# Patient Record
Sex: FEMALE | Race: WHITE | URBAN - METROPOLITAN AREA
[De-identification: names, ages, dates, MRNs, and addresses within clinical notes are randomized per-mention and may not be internally consistent; named-entity substitution may affect disease eponyms.]

---

## 2022-10-18 ENCOUNTER — OFFICE VISIT (OUTPATIENT)
Dept: NEUROLOGY | Age: 72
End: 2022-10-18
Payer: MEDICARE

## 2022-10-18 VITALS
RESPIRATION RATE: 16 BRPM | SYSTOLIC BLOOD PRESSURE: 118 MMHG | BODY MASS INDEX: 21.37 KG/M2 | TEMPERATURE: 97.8 F | WEIGHT: 113.2 LBS | DIASTOLIC BLOOD PRESSURE: 60 MMHG | HEIGHT: 61 IN

## 2022-10-18 DIAGNOSIS — Z82.0 FAMILY HISTORY OF ALZHEIMER'S DISEASE: ICD-10-CM

## 2022-10-18 DIAGNOSIS — R41.3 MEMORY DYSFUNCTION: Primary | ICD-10-CM

## 2022-10-18 DIAGNOSIS — Z85.810 HISTORY OF TONGUE CANCER: ICD-10-CM

## 2022-10-18 PROBLEM — I10 PRIMARY HYPERTENSION: Status: ACTIVE | Noted: 2022-10-18

## 2022-10-18 PROBLEM — R41.81 AGE-RELATED COGNITIVE DECLINE: Status: ACTIVE | Noted: 2020-07-21

## 2022-10-18 PROCEDURE — G8427 DOCREV CUR MEDS BY ELIG CLIN: HCPCS | Performed by: PSYCHIATRY & NEUROLOGY

## 2022-10-18 PROCEDURE — G8400 PT W/DXA NO RESULTS DOC: HCPCS | Performed by: PSYCHIATRY & NEUROLOGY

## 2022-10-18 PROCEDURE — 1101F PT FALLS ASSESS-DOCD LE1/YR: CPT | Performed by: PSYCHIATRY & NEUROLOGY

## 2022-10-18 PROCEDURE — 99205 OFFICE O/P NEW HI 60 MIN: CPT | Performed by: PSYCHIATRY & NEUROLOGY

## 2022-10-18 PROCEDURE — G8420 CALC BMI NORM PARAMETERS: HCPCS | Performed by: PSYCHIATRY & NEUROLOGY

## 2022-10-18 PROCEDURE — 3017F COLORECTAL CA SCREEN DOC REV: CPT | Performed by: PSYCHIATRY & NEUROLOGY

## 2022-10-18 PROCEDURE — 1090F PRES/ABSN URINE INCON ASSESS: CPT | Performed by: PSYCHIATRY & NEUROLOGY

## 2022-10-18 PROCEDURE — G8536 NO DOC ELDER MAL SCRN: HCPCS | Performed by: PSYCHIATRY & NEUROLOGY

## 2022-10-18 PROCEDURE — G8432 DEP SCR NOT DOC, RNG: HCPCS | Performed by: PSYCHIATRY & NEUROLOGY

## 2022-10-18 PROCEDURE — 1123F ACP DISCUSS/DSCN MKR DOCD: CPT | Performed by: PSYCHIATRY & NEUROLOGY

## 2022-10-18 RX ORDER — OMEGA-3 FATTY ACIDS/FISH OIL 300-1000MG
2000 CAPSULE ORAL DAILY
COMMUNITY

## 2022-10-18 RX ORDER — LISINOPRIL 5 MG/1
5 TABLET ORAL DAILY
COMMUNITY
Start: 2022-08-13

## 2022-10-18 NOTE — PROGRESS NOTES
Chief Complaint   Patient presents with    Follow-up     Was see Dr Randy Cazares in [facility] and states that she wants to switch. Knows that she has memory loss.

## 2022-10-18 NOTE — ASSESSMENT & PLAN NOTE
Patient clearly with some cognitive impairment but unclear related to pathology  She has a family history on her maternal and paternal line of dementia  Patient has undergone chemo and radiation therapy  History of tongue cancer with mets to the lymph nodes    We will check MRI of the brain for evaluation of possible metastasis versus other pathology  Check EEG  Order neuropsych testing with Dr. Vaishali Riddle    For now continue activity as tolerated daughter continues to be involved with the patient's care and I have encouraged her to continue with that

## 2022-10-18 NOTE — PROGRESS NOTES
PierreJillian 85  In Office NEW Pt VISIT         Americo Nelson is a 81 y.o. female who presents today for the following:  Chief Complaint   Patient presents with    Follow-up     Was see Dr Zia Jacobs in [facility] and states that she wants to switch. Knows that she has memory loss. ASSESSMENT AND PLAN    1. Memory dysfunction  Assessment & Plan:   Patient clearly with some cognitive impairment but unclear related to pathology  She has a family history on her maternal and paternal line of dementia  Patient has undergone chemo and radiation therapy  History of tongue cancer with mets to the lymph nodes    We will check MRI of the brain for evaluation of possible metastasis versus other pathology  Check EEG  Order neuropsych testing with Dr. Eduardo Hinkle    For now continue activity as tolerated daughter continues to be involved with the patient's care and I have encouraged her to continue with that  Orders:  -     REFERRAL TO NEUROPSYCHOLOGY  -     MRI BRAIN W WO CONT; Future  -     EEG; Future  2. Family history of Alzheimer's disease  Comments: On both maternal and paternal line  Assessment & Plan:     3. History of tongue cancer  Comments: With metastasis to lymph nodes history of chemo and radiation therapy  Assessment & Plan:           Follow-up and Dispositions    Return in about 6 months (around 4/18/2023) for In office appointment. ICD-10-CM ICD-9-CM    1. Memory dysfunction  R41.3 780.93 REFERRAL TO NEUROPSYCHOLOGY      MRI BRAIN W WO CONT      EEG      2. Family history of Alzheimer's disease  Z82.0 V17.2     On both maternal and paternal line      3.  History of tongue cancer  Z85.810 V10.01     With metastasis to lymph nodes history of chemo and radiation therapy        I attest that 60 minutes was spent on today's visit reviewing medical records and diagnostic testing deemed pertinent to this patient's care, along with direct time spent at patient's visit including the history, physical assessment and plan, discussing diagnosis and management along with documentation. HPI    Patient was referred to the practice for the following reason[s]:  Memory issues    Patient is accompanied by: Her daughter Tru Zavaleta  History is obtained predominantly by: Patient patient and review of records  Patient seen previously by Dr. Antolin Noriega neurology at [facility]    Onset: around 2015 after radiation but has progressed sapna notice in past 12 months    Lives independently   IADLs   FALs    Still drives; no accidents    Does own bills but at times over paid bills    Cleans own house    Daughter does more of the big meals     Per pt \"daughter over cooks\"     Walks 5 miles/ day   Not getting lost    Per daughter:  Looks dazed at times  Repeats self   calls frequently sometimes every hour    Family hx: mother and and paternal grandmother    Excerpted from Dr. Alberta Pinedo note on 10/12/2021 from [facility] 19/30 ,100 point MSE; she scored below over peers, 56/80, for her age group, patients in their 62s \"normal\" is 66-77. On short-term memory subsets of testing she scored poorly, 20/48. Lab work; normal TFTs, U74, folic acid, methylmalonic acid. 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  S/p Radiation and chemo  Second resection 2016      Osteoporosis    Vertigo October 2021   Seen in the ED at [facility] felt to be related to benign positional vertigo and she was also hypertensive with systolic blood pressures in the 190s and found to have a UTI      Pertinent diagnostic data  Imaging Results:  CT Head wo IV Contrast  Narrative: CLINICAL HISTORY: Dizziness. TECHNIQUE: CT head without IV contrast. Axial images were obtained with coronal and sagittal constructions. Contrast: None. COMPARISON: None. FINDINGS:    No acute intracranial hemorrhage. No mass effect or midline shift. Ventricles and basal cisterns are symmetric and intact. Gray-white matter differentiation is preserved. Benign-appearing intracranial complications. Globes are intact bilaterally. No acute osseous findings in the skull. Mastoid air cells and visualized paranasal sinuses are clear. Impression: 1. No acute intracranial findings. DictatedMelva Lobe On: 10/1/2021 1:03 PM  Electronically signed by: Sayra Tsai On: 10/1/2021 1:09 PM    No results found for this or any previous visit. No Known Allergies    Current Outpatient Medications   Medication Sig    aspirin 81 mg cap aspirin    multivitamin-minerals-ferrous gluconate 9 mg iron/15 mL oral liquid Take  by mouth daily. lisinopriL (PRINIVIL, ZESTRIL) 5 mg tablet Take 5 mg by mouth daily. fish oil-omega-3-DHA--1,000 mg capsule Take 2,000 mg by mouth daily. No current facility-administered medications for this visit. Past medical history/surgical history, family history, and social history have been reviewed for today's visit      ROS    A ten system review of constitutional, cardiovascular, respiratory, musculoskeletal, endocrine, skin, SHEENT, genitourinary, psychiatric and neurologic systems was obtained and is unremarkable except as mentioned under HPI          EXAMINATION:     Visit Vitals  /60 (BP 1 Location: Left upper arm, BP Patient Position: Sitting, BP Cuff Size: Adult)   Temp 97.8 °F (36.6 °C) (Temporal)   Resp 16   Ht 5' 1\" (1.549 m)   Wt 113 lb 3.2 oz (51.3 kg)   BMI 21.39 kg/m²         General appearance: Patient is well-developed and well-nourished in no apparent distress and well groomed.     Psych/mental health:  Affect: Appropriate    PHQ  3 most recent PHQ Screens 10/18/2022   Little interest or pleasure in doing things Not at all   Feeling down, depressed, irritable, or hopeless Not at all   Total Score PHQ 2 0       HEENT:   Normocephalic  With evidence of trauma: No  Full range of motion head neck: Yes  Tenderness to palpation of the head neck region: No      Cardiovascular:     Extremities warm to touch:Yes  Extremity swelling: No  Discoloration: No  Evidence of PVD: No    Respiratory:   Dyspnea on exertion: No   Abnormal effort on casual observation: No   Use of portable oxygen: No   Evidence of cyanosis: No     Musculoskeletal:   Evidence of significant bone deformities: No   Spinal curvature: No     Integumentary:    Obvious bruising: No   Lacerations or discoloration on casual observation: No       Neurological Examination:   Mental Status:        MMSE  No flowsheet data found. Formal testing was not completed    Slightly repetitive  Has some difficulty with sequencing  Daughter has to fill in some details and sometimes daughter's perspective is different than patient's perspective but not dramatically different  Clock test completed 10/18/2022: Patient was asked to put the hands at 1110 she actually place the hands at 1:55 she scored a 5 out of 6 on the clock test which roughly equates to 25 on the MMSE          Cranial Nerves:    EOMs intact gaze is conjugate  No nystagmus is appreciated  Facial motor intact bilaterally  Hearing intact to conversation  Voice with normal projection, no evidence of secretion pooling  Shoulder shrug intact bilaterally  No tongue deviation appreciated     Motor:   Normal bulk  No tremor appreciated on today's exam  No abnormal movements appreciated on today's exam  Moves extremities spontaneously and with purpose  5/5 x 4      Sensation: Intact to light touch    Coordination/Cerebellar:   FTN: Intact bilaterally;  Romberg negative    Gait: Ambulates independently with normal gait and station    Reflexes: +2 and symmetrical    Fall risk assessment  Fall Risk Assessment, last 12 mths 10/18/2022   Able to walk? Yes   Fall in past 12 months? 1   Do you feel unsteady? 0   Are you worried about falling 0   Is TUG test greater than 12 seconds? 1   Is the gait abnormal? 0   Number of falls in past 12 months 1   Fall with injury?  0               Butch Minor MS, ANP-BC, [facility]

## 2022-10-18 NOTE — PATIENT INSTRUCTIONS
As per discussion    We have ordered an MRI scan and that can be completed at [facility]    EEG has also been ordered which we will do here  Central scheduling should be calling you to set up the test that have been ordered. If you do not hear from them you can reach out to them at [phone] to get that completed. Referral has been made to Dr. Jarrell Lui for neuropsychiatric evaluation related to cognitive concerns. His  will call you to set up an appointment. However if you have not heard from the office in about a week you can contact their office at [phone]     For now continue activity as tolerated  Will try to get to the bottom of what is going on with your cognitive concerns and therefore we can further direct therapy based on those results    Will wait until we get the whole picture together to include the MRI the EEG and the neuropsych testing  I will go over the results of your MRI and EEG via [employer] and anything differently we may need to do based on those results      Office Policies      Appointments  Please make sure that you arrive for your next appointment at least 15 minutes prior to your appointment time. If for some reason you are going to be late please notify the office to determine if you need to be rescheduled or we can adjust your appointment time      Phone calls/patient messages:  Please allow up to 24 hours for someone in the office to contact you about your call or message. Be mindful your provider may be out of the office or your message may require further review. We encourage you to use [employer] for your messages as this is a faster, more efficient way to communicate with our office    Medication Refills:  Prescription medications require up to 48 business hours to process. We encourage you to use [employer] for your refills. For controlled medications: Please allow up to 72 business hours to process.  Certain medications may require you to  a written prescription at our office. NO narcotic/controlled medications will be prescribed after 4pm Monday through Friday or on weekends    Form/Paperwork Completion:  We ask that you allow 7-14 business days. You may also download your forms to [employer] to have your doctor print off.

## 2022-10-19 ENCOUNTER — TELEPHONE (OUTPATIENT)
Dept: NEUROLOGY | Age: 72
End: 2022-10-19

## 2022-10-19 NOTE — TELEPHONE ENCOUNTER
Re: MRI Brain w/w/o  [id number]    To be performed at [facility] in [place]. Patient has traditional Medicare, no P. A. required. Called the hospital to get fax number. Sent order, office notes from yesterday, copy of demo sheet, I.D., ins. Card., and noted that pt is an established pt at [facility] w/ MRN there [id number]. Faxed 18 pages.  [id number]   fax [phone]. Scanned to Media. Fax confirmation rec'd.

## 2022-11-07 ENCOUNTER — DOCUMENTATION ONLY (OUTPATIENT)
Dept: NEUROLOGY | Age: 72
End: 2022-11-07

## 2022-11-07 NOTE — PROGRESS NOTES
MRI scan of brain completed at [facility]      Results of patient's MRI scan of the brain with and without IV contrast completed 11/2/2022 the results show no enhancing parenchymal lesions; no acute ischemic changes or hemorrhage mild bilateral mastoid air cell ossification noted    There is also noted some generalized cerebral volume loss no hydrocephalus    Results will be scanned to the media tab  Patient has been notified of results via [address]

## 2022-11-16 ENCOUNTER — TELEPHONE (OUTPATIENT)
Dept: NEUROLOGY | Age: 72
End: 2022-11-16

## 2022-11-16 NOTE — TELEPHONE ENCOUNTER
Patient called to speak with Nurse. Pt would like to know if she still needs to keep her appts with Dr Jarrell Lui for 11/22. Pt also has some questions about getting her own copy of her MRI scan because she is unable to get in contact with [facility] herself.  Please call [phone]

## 2022-11-18 ENCOUNTER — TELEPHONE (OUTPATIENT)
Dept: NEUROLOGY | Age: 72
End: 2022-11-18

## 2022-11-18 NOTE — TELEPHONE ENCOUNTER
Call placed to patient. 2 identifiers received. Advised patient per Ari Valero to keep her appointment with Dr. Marcella Martínez on 11/22/22. Patient indicated understanding.

## 2022-11-22 ENCOUNTER — OFFICE VISIT (OUTPATIENT)
Dept: NEUROLOGY | Age: 72
End: 2022-11-22
Payer: MEDICARE

## 2022-11-22 DIAGNOSIS — F43.23 ADJUSTMENT DISORDER WITH MIXED ANXIETY AND DEPRESSED MOOD: ICD-10-CM

## 2022-11-22 DIAGNOSIS — G31.84 MILD COGNITIVE IMPAIRMENT: Primary | ICD-10-CM

## 2022-11-22 DIAGNOSIS — R41.3 MEMORY DYSFUNCTION: Primary | ICD-10-CM

## 2022-11-22 PROCEDURE — 96138 PSYCL/NRPSYC TECH 1ST: CPT | Performed by: CLINICAL NEUROPSYCHOLOGIST

## 2022-11-22 PROCEDURE — 1123F ACP DISCUSS/DSCN MKR DOCD: CPT | Performed by: CLINICAL NEUROPSYCHOLOGIST

## 2022-11-22 PROCEDURE — 96136 PSYCL/NRPSYC TST PHY/QHP 1ST: CPT | Performed by: CLINICAL NEUROPSYCHOLOGIST

## 2022-11-22 PROCEDURE — 96139 PSYCL/NRPSYC TST TECH EA: CPT | Performed by: CLINICAL NEUROPSYCHOLOGIST

## 2022-11-22 PROCEDURE — 90791 PSYCH DIAGNOSTIC EVALUATION: CPT | Performed by: CLINICAL NEUROPSYCHOLOGIST

## 2022-11-22 NOTE — PROGRESS NOTES
Intake Note      Patient Name: Amarjit Paige  YOB: 1950    Age: 81 y.o. Date of Intake: 11/22/2022   Education: 12 Ethnicity White   Gender: Female Referring Provider: Sunny Wood NP     REASON FOR REFERRAL AND EVALUATION PROCEDURES:  Amarjit Paige  was referred for evaluation by her Neurology Nurse Practitioner to assist in differential diagnosis and individualized treatment planning. she understood the rationale and procedures for evaluation, as well as the limits to confidentiality, and agreed to participate. she consented to have this report made available to her  treating providers through her  electronic medical records. History Sources: Patient, Relative (sister), and Medical Records    HISTORY OF PRESENT ILLNESS:  The patient is a 68-year-old woman with medical history significant for memory dysfunction, family history of Alzheimer's disease, history of tongue cancer, and primary hypertension. She presented for clinical interview accompanied by her sister who assisted with establishing history. Per the patient's report, over the past couple months, she has developed a pattern of \"double checking. \"  She reported she double checks her bank account and also repeats herself in conversations. The patient indicated she is aware that she has already done these things but feels the need to do them again. She also reported the presence of attention deficits such as becoming easily distracted. According to her sister, over the course of the past year, the patient has experienced the insidious onset of short-term episodic memory impairment. The patient's sister reported the patient will tell her something and will then repeat herself within 5 or 10 minutes. She also forgets recent events. For example, the patient will pay her sister for something and she will then repeatedly tell her sister that she needs to pay her without recognizing that she already did.   Despite the patient's cognitive difficulties, she reportedly remains functionally independent; however, she reported relying more heavily on GPS for navigating    Pertaining to the patient's psychiatric history, she denied all previous clinically significant psychiatric symptomatology, diagnosis, or treatment. The patient described her recent mood to be \"fine. \"  She denied all history of auditory or visual hallucinations as well as history of passive or active suicidal ideation, intent, or plan. PERTINENT MEDICAL HISTORY:  Patient Active Problem List   Diagnosis Code    Age-related cognitive decline R41.81    Memory dysfunction R41.3    Family history of Alzheimer's disease Z82.0    History of tongue cancer Z85.810    Primary hypertension I10      Pertaining to the patient's other medical and physical functioning, she describes her sleep to be \"good. \"  She estimated sleeping approximately 9 hours per night and generally feels rested throughout the day. To the best of her knowledge, she does not snore and she denied waking due to coughing or gasping for air. The patient also denied acting out her dreams or falling out of bed. The patient reported exercising every day for approximately 3 hours. She also denied the presence of physical problems potentially suggestive of parkinsonism, autonomic dysfunction, or sensory changes. Family neurological history: Positive for Alzheimer's disease in the patient's paternal grandmother and mother. Current Outpatient Medications:     aspirin 81 mg cap, aspirin, Disp: , Rfl:     multivitamin-minerals-ferrous gluconate 9 mg iron/15 mL oral liquid, Take  by mouth daily. , Disp: , Rfl:     lisinopriL (PRINIVIL, ZESTRIL) 5 mg tablet, Take 5 mg by mouth daily. , Disp: , Rfl:     fish oil-omega-3-DHA--1,000 mg capsule, Take 2,000 mg by mouth daily. , Disp: , Rfl:     Pertinent Neurological History:  Seizure: Never  Stroke: Never  TBI: Never    Neurodiagnostic Findings:  Imaging: Neurology documentation only note (11/7/2022) indicates the patient completed an MRI of the brain on 11/2/2022. Results revealed \"no enhancing parenchymal lesions; no acute ischemic changes or hemorrhage mild bilateral mastoid air cell ossification noted. There is also noted some generalized cerebral volume loss no hydrocephalus. \"  EEG: Ordered, not yet obtained    PSYCHOSOCIAL HISTORY:  Birth/Development: Born and raised in [place]  Language: [place]  Education: Graduated from high school  Academic Problems: Repeated the third grade. Typically earned \"C's. 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 1 child passed away  Housing: Independently in private residence  Legal: Denied. No POA    Substance Use:  Alcohol: Denied  Nicotine: Denied  Recreational/Illicit Substances: Denied  Treatment: Denied    BEHAVIORAL OBSERVATIONS:  Appearance: Casually dressed, Well-groomed, and Appeared stated age  Orientation: Person, Place, Time, and Situation. Able to provide nonspecific information related to recent events seen on the news.   Motor: Ambulated independently, Adequate gait, Adequate posture, No involuntary movements, and Adequate strength  Thought Processes: Clear, Coherent, Intact, Logical, and Organized  Hearing and Vision: Adequate  Speech: shows no evidence of impairment  Comprehension: Adequate  Interpersonal Skills: Adequate  Affect: Appropriate  Ability as Historian: Fair  Insight: Fair  Judgment: Fair    STRENGTHS:  Exercising self-direction/Resourceful, Access to housing/residential stability, and Interpersonal/supportive relationships (family, friends, peers)    WEAKNESSES:  Health problems and Cognitive limitations    IMPRESSION:  The patient is a 74-year-old woman who presents with concerns regarding her cognitive functioning. At the current time, the objective presence of her cognitive difficulties, their severity, and the pattern of weaknesses are unknown. The degree to which they are related to normal aging versus psychiatric factors versus an organic etiology requires further clarification. Comprehensive evaluation will assist with obtaining a quantitative assessment of the patient's cognitive and emotional functioning in order to guide differential diagnosis and treatment planning.     ASSESSMENT:  Memory dysfunction: R41.3    PLAN:  Patient will participate in comprehensive evaluation in order to obtain a quantitative assessment of their cognitive and emotional functioning  Differential diagnosis and treatment planning will be based upon results from clinical interview and objective testing  Patient will be provided with review of results, impressions, and recommendations during feedback session  Patient will be encouraged to follow-up with referring provider for ongoing management        TIME DOCUMENTATION:  Clinical Interview: 0028 - 4739 = 20 minutes    [id number]

## 2022-12-06 PROBLEM — F43.23 ADJUSTMENT DISORDER WITH MIXED ANXIETY AND DEPRESSED MOOD: Status: ACTIVE | Noted: 2022-12-06

## 2022-12-06 PROBLEM — G31.84 MILD COGNITIVE IMPAIRMENT: Status: ACTIVE | Noted: 2020-07-21

## 2022-12-06 NOTE — PROGRESS NOTES
Neuropsychological Evaluation Report      Patient Name: Liban Deal  YOB: 1950    Age: 81 y.o. Date of Intake: 2022   Education: 12 Date of Testin2022   Gender: Female Ethnicity: White     Referring Provider: Ramos Bergeron NP     REASON FOR REFERRAL AND EVALUATION PROCEDURES:  Liban Deal  was referred for neuropsychological evaluation by her Neurology Nurse Practitioner to obtain a quantitative assessment of her current level of neurocognitive functioning, assist in differential diagnosis, and aid in individualized treatment planning. The patient understood the rationale and procedures for evaluation, as well as the limits to confidentiality, and they agreed to participate. The patient consented to have this report made available to her  treating providers through her  electronic medical records. This evaluation was completed with the patient by Amol Garnett PsyD with the exception of testing by technician, which was completed by SUGAR Miles under the supervision of Dr. Clemente Bach. History Sources: Patient, Relative (Sister), Medical Records, Rating Scales, and Assessment Instruments    SUMMARY AND IMPRESSION:  The following section is a summary of the patient's pertinent history, test results, and impressions. A more thorough review of the patient's background and test scores can be found below. The patient is a 62-year-old woman with medical history significant for memory dysfunction, family history of Alzheimer's disease, history of tongue cancer, and primary hypertension. Over the course of the past year, she has experienced the insidious onset of gradually progressive short-term episodic memory impairment. While the patient was reported to rely more heavily on her GPS for navigation while driving, she reportedly remains functionally independent.     The patient's neuropsychological evaluation was most significant for mild impairment (~1.5 standard deviations below the mean) on measures of learning, profound impairment (3 standard deviations below the mean) on measures of free recall, and variable but significantly reduced benefit from recognition cueing, with evidence of not recognizing previously encoded stimuli. While executive functioning was variable and generally inefficient (~1 standard deviation below the mean), aspects of executive functioning were more significantly impaired (e.g., inhibition/switching, problem solving, and planning) which also contributed to weakness on visuospatial construction measures more heavily subserved by executive functioning. The patient's performances on remaining domains of cognitive functioning (mental processing speed, attention/working memory, expressive language) remained within normal limits, commensurate with expectations of premorbid functioning. Simulated assessment of daily functioning and practical judgment remained within normal limits. Brief assessment of psychopathology revealed clinically significant symptoms of depression and anxiety. Diagnostically, while the magnitude of impairment demonstrated on free recall memory tests would be more consistent with expectations for dementia (2+ standard deviations below the mean), the patient reportedly remains functionally independent and her simulated functional testing was preserved. Taking these factors into consideration, the diagnosis of mild cognitive impairment is most appropriate. Regarding the etiology of cognitive dysfunction, in a patient of this age, the insidious onset of gradually progressive cognitive impairment suggests the presence of a neurodegenerative condition. More specifically, the short-term episodic memory deficits on testing indicate temporal lobe dysfunction, most likely due to Alzheimer's disease.         ASSESSMENT:  Mild cognitive impairment, with memory loss  Adjustment disorder with mixed anxiety and depressed mood    RECOMMENDATIONS:  The patient currently has a feedback session scheduled for 12/9/2022. During this appointment, the results of the evaluation will be reviewed, recommendations will be offered (see below), and the patient will be provided with the opportunity to ask questions. The patient will be encouraged to review the results of this evaluation with her providers and discuss potential implications for treatment. Specifically:  If desired and not already considered, the patient may benefit from a conversation regarding the trial of a disease course altering medication, which may be of use to slow the progression of her cognitive difficulties. While the patient described her recent mood to be \"fine,\" testing suggested the presence of clinically significant symptoms of depression and anxiety. The patient may benefit from a conversation regarding the trial of psychotropic medication to reduce the symptoms. Rule out of potentially reversible contributory factors may assist in refining differential diagnosis. For this reason, if warranted and not already considered, obtaining the following labs may be beneficial: B12, Folate, TSH, & T4 Free. At this time, the patient's neuropsychological evaluation suggests she does maintain capacity to make decisions. If not already addressed, the patient and her family are encouraged to discuss legal issues such as power of  to assist the patient in future financial and healthcare decisions. Information regarding these issues can be found at www.caringinfo. org or www.agingwithdignity.org/5wishes.html  Continued monitoring and treatment of the patient's neuropsychiatric symptoms/cognitive impairment is recommended. Due to their level of cognitive impairment, they will benefit from a combination of psychopharmacology, environmental management, and concrete behaviorally-focused interventions.  Examples of environmental strategies include:  Keep a steady routine environment and using visual cues, such as whiteboards, to help provide reminders of tasks. Take additional time to complete tasks and limit the amount of external distracters in the environment when having to focus on a task;  Break larger tasks into smaller ones and take frequent, regularly scheduled breaks; Write down information as soon as possible and to utilize a day planner/calendar, especially when recording doctor's appointments and medicine regimens;  Development of a memory book may be beneficial. Memory books typically contain a day planner/calendar component; however, they also contain organized sections for recording frequently asked questions and information about routines that the patient will be able to access and use independently. Pair behaviors or items to be remembered with well learned patterns or routines. For example, place medications by your toothbrush so that brushing your teeth will cue you to take your medication; and,  Designate a memory place in which you keep all of your important personal belongings (e.g. wallet, keys) when not in use. The patient will be provided with psychoeducation regarding empirically determined modifiable risk and protective factors for cognitive decline. Specifically:  The patient will be encouraged to engage in approximately 2.5 hours of physician approved physical activity on a weekly basis. The patient will be encouraged to maintain a healthy diet. they will also be informed of the Mediterranean diet, which has been associated with reduced risk for neurodegenerative conditions as well as heart disease. The patient will be encouraged to maintain a cognitively stimulated lifestyle, also incorporating social interaction. The patient's family and caretakers may benefit from reaching out to local support groups in the community. These resources may be able to provide medical assistance or support and reduce the risk of caregiver burnout.     The Alzheimer's Association (Phone: 428.111.1274; Website: Kae Ye) provides general information about cognitive decline in late life, along with local resources. The book The 36 Hour Day: A Family Guide to Caring for Persons with Alzheimer's Disease, Related Dementing Illnesses, and Memory Loss in Later Life, by Alfa Steward and Ollie Francis. I would like to see the patient for follow-up evaluation in approximately 12 months in order to monitor her progress and update treatment planning. HISTORY OF PRESENT ILLNESS:  The patient is a 68-year-old woman with medical history significant for memory dysfunction, family history of Alzheimer's disease, history of tongue cancer, and primary hypertension. She presented for clinical interview accompanied by her sister who assisted with establishing history. Per the patient's report, over the past couple months, she has developed a pattern of \"double checking. \"  She reported she double checks her bank account and also repeats herself in conversations. The patient indicated she is aware that she has already done these things but feels the need to do them again. She also reported the presence of attention deficits such as becoming easily distracted. According to her sister, over the course of the past year, the patient has experienced the insidious onset of short-term episodic memory impairment. The patient's sister reported the patient will tell her something and will then repeat herself within 5 or 10 minutes. She also forgets recent events. For example, the patient will pay her sister for something and she will then repeatedly tell her sister that she needs to pay her without recognizing that she already did.   Despite the patient's cognitive difficulties, she reportedly remains functionally independent; however, she reported relying more heavily on GPS for navigating     Pertaining to the patient's psychiatric history, she denied all previous clinically significant psychiatric symptomatology, diagnosis, or treatment. The patient described her recent mood to be \"fine. \"  She denied all history of auditory or visual hallucinations as well as history of passive or active suicidal ideation, intent, or plan. PERTINENT MEDICAL HISTORY:       Patient Active Problem List   Diagnosis Code    Age-related cognitive decline R41.81    Memory dysfunction R41.3    Family history of Alzheimer's disease Z82.0    History of tongue cancer Z85.810    Primary hypertension I10      Pertaining to the patient's other medical and physical functioning, she describes her sleep to be \"good. \"  She estimated sleeping approximately 9 hours per night and generally feels rested throughout the day. To the best of her knowledge, she does not snore and she denied waking due to coughing or gasping for air. The patient also denied acting out her dreams or falling out of bed. The patient reported exercising every day for approximately 3 hours. She also denied the presence of physical problems potentially suggestive of parkinsonism, autonomic dysfunction, or sensory changes. Family neurological history: Positive for Alzheimer's disease in the patient's paternal grandmother and mother. Current Outpatient Medications:     aspirin 81 mg cap, aspirin, Disp: , Rfl:     multivitamin-minerals-ferrous gluconate 9 mg iron/15 mL oral liquid, Take  by mouth daily. , Disp: , Rfl:     lisinopriL (PRINIVIL, ZESTRIL) 5 mg tablet, Take 5 mg by mouth daily. , Disp: , Rfl:     fish oil-omega-3-DHA--1,000 mg capsule, Take 2,000 mg by mouth daily. , Disp: , Rfl:      Pertinent Neurological History:  Seizure: Never  Stroke: Never  TBI: Never     Neurodiagnostic Findings:  Imaging: Neurology documentation only note (11/7/2022) indicates the patient completed an MRI of the brain on 11/2/2022. Results revealed \"no enhancing parenchymal lesions; no acute ischemic changes or hemorrhage mild bilateral mastoid air cell ossification noted.   There is also noted some generalized cerebral volume loss no hydrocephalus. \"  EEG: Ordered, not yet obtained     PSYCHOSOCIAL HISTORY:  Birth/Development: Born and raised in [place]  Language: [place]  Education: Graduated from high school  Academic Problems: Repeated the third grade. Typically earned \"C's. 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 1 child passed away  Housing: Independently in private residence  Legal: Denied. No POA     Substance Use:  Alcohol: Denied  Nicotine: Denied  Recreational/Illicit Substances: Denied  Treatment: Denied     BEHAVIORAL OBSERVATIONS:  Appearance: Casually dressed, Well-groomed, and Appeared stated age  Orientation: Person, Place, Time, and Situation. Able to provide nonspecific information related to recent events seen on the news. Motor: Ambulated independently, Adequate gait, Adequate posture, No involuntary movements, and Adequate strength  Thought Processes: Clear, Coherent, Intact, Logical, and Organized  Hearing and Vision: Adequate  Speech: shows no evidence of impairment  Comprehension: Adequate  Interpersonal Skills: Adequate  Affect: Appropriate  Ability as Historian: Fair  Insight: Fair  Judgment: Fair  Testing Behaviors: Rapport was adequately established between the patient and the examiner. The patient remained alert and focused throughout testing. They maintained a cooperative attitude and appeared to approach measures and a goal oriented fashion. TESTING  Measures administered by provider:  Test of Premorbid Functioning (TOPF) and Clock drawing test    Measures administered by technician:  Lanie Hill; Wechsler Adult Intelligence Scale - Fourth Edition (WAIS-IV: Select Components);  Trail Making Test A&B; Mellissa-Rizzo Executive Function System (D-KEFS: Select Components); Modified [employer] (M-WCST); Judgment of Line Orientation (Amee Cease); Chaka Complex Figure Test - Copy (RCFT: Copy); Verbal Fluency (FAS & Animals); Neuropsychological Assessment Battery (NAB: Select Language Components); Osorio Verbal Learning Test - Revised (HVLT-R); Brief Visuospatial Memory Test - Revised (BVMT-R); Wechsler Memory Scale - Fourth Edition (WMS-IV: Select Components); Pillbox Test; Test of Practical Judgment (TOP-J: Form B); [place] Sleepiness Scale (ESS); [place] Sleep Quality Inventory (PSQI); Geriatric Depression Scale - Short Form (GDS-SF); and Generalized Anxiety Disorder - 7 (LINDA-7). Results: For standardized tests, performance was compared to a demographically matched sample of neurocognitively healthy adults using the following classification system to indicate deviation from mean (or average) test performance:    Normally Distributed Not-Normally Distributed   Descriptor Percentile Descriptor Percentile   \"Exceptionally High\" >97th \"Within Normal Limits\" >24th   \"Above Average\" 91st - 97th \"Low Average\" 9th - 24th   \"High Average\" 75th - 90th \"Below Average\" 2nd - 8th   \"Average\" 25th - 74th \"Exceptionally Low\" <2nd   \"Low Average\" 9th - 24th    \"Below Average\" 2nd - 8th    \"Exceptionally Low\" <2nd      Performance and symptom validity are analyzed in a number of ways, including administration of neuropsychological measures that have been empirically shown to identify suboptimal performance or purposeful exaggeration.  These tests and their scores have been redacted from this report in order to ensure test security, but are available to formally trained neuropsychologists upon request. In addition, when possible, the overall pattern of performance is analyzed for consistency between measures, consistency with the expected severity of impairment, and the presenting symptoms are compared against base rates of symptoms in other patients with similar problems. The patient's performances were within acceptable limits, indicating the results of the present evaluation are believed to be valid and reliable. Premorbid Functioning:   Average  Attention:   Brief auditory attention: Low average  Auditory working memory: Average  Processing Speed:  Low average to high average but generally in the average range  Executive Functioning:   Mental set switching: Average; no errors  Problem Solving: Exceptionally low  Inhibition: Average for speed and high average for accuracy  Inhibition/Switching: Discontinued due to time limits. During the completed portion, the patient's accuracy was in the below average range  Visuospatial:  Basic visuoperception: Average  Clock drawing: Within normal limits  Pattern reconstruction: Average  Complex figure copy: Below average due to a disorganized and piecemeal approach. Language:  Confrontation naming: Within normal limits  Letter fluency: Average  Semantic Fluency: Average  Learning   List learning: Below average  Story learning: Low average  Basic figure learning: Exceptionally low  Progressively complex figure learning: Average  Memory:  List recall: Exceptionally low  Story recall: Exceptionally low  Basic figure recall: Exceptionally low  Progressively complex figure recall: Exceptionally low  Recognition:  List recognition: Exceptionally low; significant for false negative responding to previously encoded stimuli  Story recognition: Low average  Basic figure recognition: Below average  Progressively complex figure recognition: Within normal limits  Functional:   Bill pay: Low average  Practical judgment: Low average  Psychiatric/Sleep:   Depression: Mild  Anxiety: Moderate  Daytime sleepiness: Within normal limits  Sleep quality: Elevated. The patient reported difficulty sustaining sleep which she attributed to needing to use the bathroom.     TIME:  Clinical Interview: 7277 - 5187 = 20 minutes  Testing and scoring by provider: 16 minutes  Testing by technician: 3462 - 0019 = 142 minutes  Scoring by technician: 31 minutes    BILLING:  90791 x 1 Unit  96136 x 1 Unit  96138 x 1 Unit  96139 x 5 Units

## 2022-12-09 ENCOUNTER — VIRTUAL VISIT (OUTPATIENT)
Dept: NEUROLOGY | Age: 72
End: 2022-12-09
Payer: MEDICARE

## 2022-12-09 DIAGNOSIS — G31.84 MILD COGNITIVE IMPAIRMENT: Primary | ICD-10-CM

## 2022-12-09 NOTE — PROGRESS NOTES
Pursuant to the emergency declaration under the [facility] waiver authority and the [employer] Act, this Virtual Visit was conducted, with appropriate consent obtained, to reduce the patient's risk of exposure to COVID-19 and provide continuity of care   Services were provided in this manner to substitute for in-person clinic visit. The originating site is the patient's home and the distance site is [facility] at [facility]. These types of teleneuropsychology/telehealth/telemedicine visits were authorized by the President of the [employer], though I/we cannot guarantee what a third party payor will do reimbursement/coverage wise. I indicated that I would evaluate the patient and recommend diagnostics and treatment based on my assessment and impressions, and that our sessions are not being recorded and that personal health information is protected to the best of our abilities. Prior to seeing the patient I reviewed pertinent records, including the previously completed report, the records in [place], and any updated visits from other providers since the patient's last visit. Today, I engaged in a psychoeducational and supportive feedback session with the patient. I provided psychotherapy in the form of psychoeducation and support with respect to the results of the recent Neuropsychological Evaluation, including discussing individual tests as well as patient's areas of neurocognitive strength versus weakness.     We discussed, in detail, the following:  Testing revealed mild to moderate impairment in learning and memory  As the patient remains functionally independent, she meets criteria for the diagnosis of mild cognitive impairment  The patient's cognitive profile is most consistent with an Alzheimer's disease process which is also prevalent in her family history  Reviewed recommendations outlined in report  Answered questions to the best of my ability    Education was provided regarding my diagnostic impressions, and we discussed treatment plan/options. I also answered numerous questions related to the clinical findings, including discussing various methods to improve cognition and mood. Supportive/Cognitive Behavioral/Solution Focused psychotherapy provided. The patient needs to: Follow-up with referring provider for ongoing management  Designate someone as her power of   Use practical compensatory strategies for cognitive weaknesses  Emphasize modifiable protective behaviors including exercise, diet, and cognitive stimulation  Return in 12 months    The patient had the following concerns which I deferred to their referring provider:   meds for cognition      TIME:  Clinical Interview: 1205 - 1225 = 20 minutes  Testing and scoring by provider: 16 minutes  Testing by technician: 1587 - 1527 = 142 minutes  Scoring by technician: 31 minutes  Neuropsychological testing evaluation services*: 139 minutes + 25 minutes feedback = 164 minutes total    BILLING:  39131 x 1 Unit  96136 x 1 Unit  96138 x 1 Unit  96139 x 5 Units  96132 x 1 Unit  96133 x 2 Units    *Neuropsychological testing evaluation services include: Integration of patient data, interpretation of standardized test results and clinical data, clinical decision-making, treatment planning and report, and interactive feedback to the patient, family member(s) or caregiver(s), when performed.

## 2022-12-19 ENCOUNTER — TELEPHONE (OUTPATIENT)
Dept: NEUROLOGY | Age: 72
End: 2022-12-19

## 2022-12-19 NOTE — TELEPHONE ENCOUNTER
Pt states we were supposed to send a script in for her over a month ago but she has not received anything. Does not remember the name of script either. Please call to discuss.  [phone]

## 2022-12-30 ENCOUNTER — TELEPHONE (OUTPATIENT)
Dept: NEUROLOGY | Age: 72
End: 2022-12-30

## 2022-12-30 NOTE — TELEPHONE ENCOUNTER
Confirmed patient id and patient states that she was suppose to be getting a medication but does not know the name of the medication   Advised that I see that she called here with that question on 12/19/2022 and I tried to contact and lvm. Advised that I was trying to find out what the medication was being prescribed. Advised that Estelle Youngblood has not seen her  since 10/18/2022 and I donot see any medication orders from that visit.       Patient did see Dr Donald Valdez on 11/22/2022 not sure if there was to be medication ordered after her visit with Amy Vernon

## 2023-01-02 DIAGNOSIS — Z82.0 FAMILY HISTORY OF ALZHEIMER'S DISEASE: ICD-10-CM

## 2023-01-02 DIAGNOSIS — G31.84 MILD COGNITIVE IMPAIRMENT: Primary | ICD-10-CM

## 2023-01-02 RX ORDER — DONEPEZIL HYDROCHLORIDE 5 MG/1
5 TABLET, FILM COATED ORAL
Qty: 90 TABLET | Refills: 1 | Status: SHIPPED | OUTPATIENT
Start: 2023-01-02

## 2023-01-02 NOTE — PROGRESS NOTES
Order donepezil 5 mg 1 daily based on results of neuropsych testing  It was sent to [facility] pharmacy in [facility] on [address]

## 2023-04-19 ENCOUNTER — OFFICE VISIT (OUTPATIENT)
Dept: NEUROLOGY | Age: 73
End: 2023-04-19
Payer: MEDICARE

## 2023-04-19 VITALS
HEART RATE: 70 BPM | SYSTOLIC BLOOD PRESSURE: 130 MMHG | TEMPERATURE: 97.6 F | RESPIRATION RATE: 16 BRPM | HEIGHT: 61 IN | WEIGHT: 108.2 LBS | OXYGEN SATURATION: 100 % | DIASTOLIC BLOOD PRESSURE: 88 MMHG | BODY MASS INDEX: 20.43 KG/M2

## 2023-04-19 DIAGNOSIS — G31.84 MILD COGNITIVE IMPAIRMENT: Primary | ICD-10-CM

## 2023-04-19 PROCEDURE — 1090F PRES/ABSN URINE INCON ASSESS: CPT | Performed by: PSYCHIATRY & NEUROLOGY

## 2023-04-19 PROCEDURE — G8420 CALC BMI NORM PARAMETERS: HCPCS | Performed by: PSYCHIATRY & NEUROLOGY

## 2023-04-19 PROCEDURE — 3075F SYST BP GE 130 - 139MM HG: CPT | Performed by: PSYCHIATRY & NEUROLOGY

## 2023-04-19 PROCEDURE — 1123F ACP DISCUSS/DSCN MKR DOCD: CPT | Performed by: PSYCHIATRY & NEUROLOGY

## 2023-04-19 PROCEDURE — G9717 DOC PT DX DEP/BP F/U NT REQ: HCPCS | Performed by: PSYCHIATRY & NEUROLOGY

## 2023-04-19 PROCEDURE — G8536 NO DOC ELDER MAL SCRN: HCPCS | Performed by: PSYCHIATRY & NEUROLOGY

## 2023-04-19 PROCEDURE — 99215 OFFICE O/P EST HI 40 MIN: CPT | Performed by: PSYCHIATRY & NEUROLOGY

## 2023-04-19 PROCEDURE — 3017F COLORECTAL CA SCREEN DOC REV: CPT | Performed by: PSYCHIATRY & NEUROLOGY

## 2023-04-19 PROCEDURE — 3079F DIAST BP 80-89 MM HG: CPT | Performed by: PSYCHIATRY & NEUROLOGY

## 2023-04-19 PROCEDURE — 1101F PT FALLS ASSESS-DOCD LE1/YR: CPT | Performed by: PSYCHIATRY & NEUROLOGY

## 2023-04-19 PROCEDURE — G8400 PT W/DXA NO RESULTS DOC: HCPCS | Performed by: PSYCHIATRY & NEUROLOGY

## 2023-04-19 PROCEDURE — G8427 DOCREV CUR MEDS BY ELIG CLIN: HCPCS | Performed by: PSYCHIATRY & NEUROLOGY

## 2023-04-19 RX ORDER — DONEPEZIL HYDROCHLORIDE 10 MG/1
10 TABLET, FILM COATED ORAL
Qty: 90 TABLET | Refills: 3 | Status: SHIPPED | OUTPATIENT
Start: 2023-04-19

## 2023-04-19 NOTE — PROGRESS NOTES
[facility]  In Office Follow-up Pt VISIT         Tammie Salazar is a 81 y.o. female who presents today for the following:  Chief Complaint   Patient presents with    Memory Loss     Patient is here for a follow up to assess memory loss/cognitive impairment and a med evaluation. Follow-up    Medication Evaluation         ASSESSMENT AND PLAN    1. Mild cognitive impairment  Assessment & Plan: We will increase Aricept to 10 mg daily discussed realistic expectations regarding medications and its FDA approved uses versus using this and mild cognitive impairment but given family history and findings on neuropsych testing patient is at high risk for progression to diagnosis of AD    I have strongly encouraged the patient while she still has good capabilities for decision-making to make sure all of her legal medical financial and long-term care plans are completed  Orders:  -     donepeziL (Aricept) 10 mg tablet; Take 1 Tablet by mouth nightly., Normal, Disp-90 Tablet, R-3          Follow-up and Dispositions    Return in about 7 months (around 11/19/2023) for In office appointment. ICD-10-CM ICD-9-CM    1. Mild cognitive impairment  G31.84 331.83 donepeziL (Aricept) 10 mg tablet          I attest that 40 minutes was spent on today's visit reviewing medical records and diagnostic testing deemed pertinent to this patient's care, along with direct time spent at patient's visit including the history, physical assessment and plan, discussing diagnosis and management along with documentation.         HPI    Patient was referred to the practice for the following reason[s]:  Memory issues    Patient is accompanied by: Her daughter Jermain Castellanos  History is obtained predominantly by: Patient patient and review of records  Patient seen previously by Dr. Silas Lara neurology at [facility]    Onset: around 2015 after radiation but has progressed sapna notice in past 15 months    Lives independently   IADLs   FALs    Still drives; no accidents    Does own bills but at times over paid bills    Cleans own house    Daughter does more of the big meals     Per pt \"daughter over cooks\"     Walks 5 miles/ day   Not getting lost    Per daughter:  Looks dazed at times  Repeats self   calls frequently sometimes every hour    Family hx: mother and and paternal grandmother    Excerpted from Dr. Jesus Lee note on 10/12/2021 from [facility] 19/30 ,100 point MSE; she scored below over peers, 56/80, for her age group, patients in their 62s \"normal\" is 66-77. On short-term memory subsets of testing she scored poorly, 20/48. Lab work; normal TFTs, S69, folic acid, methylmalonic acid. 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  S/p Radiation and chemo  Second resection 2016      Osteoporosis    Vertigo October 2021   Seen in the ED at [facility] felt to be related to benign positional vertigo and she was also hypertensive with systolic blood pressures in the 190s and found to have a UTI      Interim data:     Patient is here along with her twin sister history is obtained by both    Reviewed the neuropsych testing again with pt and sister    Pt remains IADLs and FALs including medication management finances and driving   No accidents reported    Patient presently lives alone but family checks in on her regularly               Pertinent diagnostic data    Neuropsych testing completed by Dr. Rashad Chavez November 2022  ASSESSMENT:  Mild cognitive impairment, with memory loss  Adjustment disorder with mixed anxiety and depressed mood      Imaging Results:  CT Head wo IV Contrast  Narrative: CLINICAL HISTORY: Dizziness. TECHNIQUE: CT head without IV contrast. Axial images were obtained with coronal and sagittal constructions. Contrast: None. COMPARISON: None. FINDINGS:    No acute intracranial hemorrhage. No mass effect or midline shift. Ventricles and basal cisterns are symmetric and intact. Gray-white matter differentiation is preserved. Benign-appearing intracranial complications. Globes are intact bilaterally. No acute osseous findings in the skull. Mastoid air cells and visualized paranasal sinuses are clear. Impression: 1. No acute intracranial findings. Apurva Patino On: 10/1/2021 1:03 PM  Electronically signed by: Per Craig On: 10/1/2021 1:09 PM    No results found for this or any previous visit. No Known Allergies    Current Outpatient Medications   Medication Sig    donepeziL (Aricept) 10 mg tablet Take 1 Tablet by mouth nightly. aspirin 81 mg cap aspirin    multivitamin-minerals-ferrous gluconate 9 mg iron/15 mL oral liquid Take  by mouth daily. lisinopriL (PRINIVIL, ZESTRIL) 5 mg tablet Take 1 Tablet by mouth daily. fish oil-omega-3-DHA--1,000 mg capsule Take 2,000 mg by mouth daily. No current facility-administered medications for this visit.        Past medical history/surgical history, family history, and social history have been reviewed for today's visit      ROS    A ten system review of constitutional, cardiovascular, respiratory, musculoskeletal, endocrine, skin, SHEENT, genitourinary, psychiatric and neurologic systems was obtained and is unremarkable except as mentioned under HPI          EXAMINATION:     Visit Vitals  /88 (BP 1 Location: Left upper arm, BP Patient Position: Sitting, BP Cuff Size: Large adult)   Pulse 70   Temp 97.6 °F (36.4 °C) (Temporal)   Resp 16   Ht 5' 1\" (1.549 m)   Wt 108 lb 3.2 oz (49.1 kg)   SpO2 100%   BMI 20.44 kg/m²           General appearance: Patient is well-developed and well-nourished in no apparent distress and well groomed. Psych/mental health:  Affect: Appropriate    PHQ  3 most recent PHQ Screens 4/19/2023   Little interest or pleasure in doing things Not at all   Feeling down, depressed, irritable, or hopeless Not at all   Total Score PHQ 2 0       HEENT:   Normocephalic  With evidence of trauma: No  Full range of motion head neck: Yes  Tenderness to palpation of the head neck region: No      Cardiovascular:     Extremities warm to touch:Yes  Extremity swelling: No  Discoloration: No  Evidence of PVD: No    Respiratory:   Dyspnea on exertion: No   Abnormal effort on casual observation: No   Use of portable oxygen: No   Evidence of cyanosis: No     Musculoskeletal:   Evidence of significant bone deformities: No   Spinal curvature: No     Integumentary:    Obvious bruising: No   Lacerations or discoloration on casual observation: No       Neurological Examination:   Mental Status:        MMSE  No flowsheet data found. Formal testing was not completed    Slightly repetitive  Has some difficulty with sequencing  Is alert and appropriate        Cranial Nerves:    Grossly intact    Motor:   Normal bulk  No tremor appreciated on today's exam  No abnormal movements appreciated on today's exam  Moves extremities spontaneously and with purpose        Sensation: Intact to light touch    Coordination/Cerebellar:   FTN: Intact bilaterally; Romberg negative    Gait: Ambulates independently with normal gait and station    Reflexes: +2 and symmetrical    Fall risk assessment  Fall Risk Assessment, last 12 mths 4/19/2023   Able to walk? Yes   Fall in past 12 months? 0   Do you feel unsteady? 0   Are you worried about falling 0   Is TUG test greater than 12 seconds? -   Is the gait abnormal? -   Number of falls in past 12 months -   Fall with injury?  Ney Brown MS, ANP-BC, [facility]

## 2023-04-19 NOTE — ASSESSMENT & PLAN NOTE
We will increase Aricept to 10 mg daily discussed realistic expectations regarding medications and its FDA approved uses versus using this and mild cognitive impairment but given family history and findings on neuropsych testing patient is at high risk for progression to diagnosis of AD    I have strongly encouraged the patient while she still has good capabilities for decision-making to make sure all of her legal medical financial and long-term care plans are completed

## 2023-04-19 NOTE — PATIENT INSTRUCTIONS
As per discussion I have increased your donepezil to 10 mg 1 time per day    I also want you to make sure that you have all your medical affairs in order regarding power of  your wheels and a trust etc. as you do have a diagnosis of mild cognitive impairment you have a family history of Alzheimer's right now you are certainly capable of making your own decisions we want to make sure that the paperwork is done should you have more difficulty with decision making issues down the road    We also talked about making sure there is a second set of eyes on your finances and your medication management sometimes you may think you doing things correctly when you are not. In addition if there is some sort of issue that develops down the road if everyone is in the habit of double checking each other problems will be picked up early before they become a major issue      Office Policies      Appointments  Please make sure that you arrive for your next appointment at least 15 minutes prior to your appointment time. If for some reason you are going to be late please notify the office to determine if you need to be rescheduled or we can adjust your appointment time      Phone calls/patient messages:  Please allow up to 24 hours for someone in the office to contact you about your call or message. Be mindful your provider may be out of the office or your message may require further review. We encourage you to use [employer] for your messages as this is a faster, more efficient way to communicate with our office    Medication Refills:  Prescription medications require up to 48 business hours to process. We encourage you to use [employer] for your refills. For controlled medications: Please allow up to 72 business hours to process. Certain medications may require you to  a written prescription at our office.     NO narcotic/controlled medications will be prescribed after 4pm Monday through Friday or on weekends    Form/Paperwork Completion:  We ask that you allow 7-14 business days. You may also download your forms to [employer] to have your doctor print off.

## 2023-04-19 NOTE — PROGRESS NOTES
Chief Complaint   Patient presents with    Memory Loss     Patient is here for a follow up to assess memory loss/cognitive impairment and a med evaluation. Follow-up    Medication Evaluation       Vitals:    04/19/23 1050   BP: 130/88   Pulse: 70   Resp: 16   Temp: 97.6 °F (36.4 °C)   TempSrc: Temporal   SpO2: 100%   Weight: 108 lb 3.2 oz (49.1 kg)   Height: 5' 1\" (1.549 m)   PainSc:   2   PainLoc: Foot       Current Outpatient Medications on File Prior to Visit   Medication Sig Dispense Refill    donepeziL (ARICEPT) 5 mg tablet Take 1 Tablet by mouth nightly. 90 Tablet 1    aspirin 81 mg cap aspirin      multivitamin-minerals-ferrous gluconate 9 mg iron/15 mL oral liquid Take  by mouth daily. lisinopriL (PRINIVIL, ZESTRIL) 5 mg tablet Take 1 Tablet by mouth daily. fish oil-omega-3-DHA--1,000 mg capsule Take 2,000 mg by mouth daily. No current facility-administered medications on file prior to visit. No Known Allergies        1. Have you been to the ER, urgent care clinic since your last visit? Hospitalized since your last visit? No    2. Have you seen or consulted any other health care providers outside of the [address] [place] [address] since your last visit? Include any pap smears or colon screening.  No

## 2023-11-14 ENCOUNTER — TELEPHONE (OUTPATIENT)
Age: 73
End: 2023-11-14

## 2023-11-14 NOTE — TELEPHONE ENCOUNTER
Patient called to cancel her appts for today. Stated she will call us back when she is ready to r/s.

## 2023-11-28 ENCOUNTER — OFFICE VISIT (OUTPATIENT)
Age: 73
End: 2023-11-28
Payer: MEDICARE

## 2023-11-28 VITALS
BODY MASS INDEX: 21.3 KG/M2 | HEIGHT: 61 IN | HEART RATE: 62 BPM | DIASTOLIC BLOOD PRESSURE: 70 MMHG | TEMPERATURE: 98.1 F | OXYGEN SATURATION: 96 % | SYSTOLIC BLOOD PRESSURE: 128 MMHG | RESPIRATION RATE: 16 BRPM | WEIGHT: 112.8 LBS

## 2023-11-28 DIAGNOSIS — G31.84 MILD COGNITIVE IMPAIRMENT: Primary | ICD-10-CM

## 2023-11-28 DIAGNOSIS — Z82.0 FAMILY HISTORY OF ALZHEIMER'S DISEASE: ICD-10-CM

## 2023-11-28 PROCEDURE — 3078F DIAST BP <80 MM HG: CPT | Performed by: PSYCHIATRY & NEUROLOGY

## 2023-11-28 PROCEDURE — G8427 DOCREV CUR MEDS BY ELIG CLIN: HCPCS | Performed by: PSYCHIATRY & NEUROLOGY

## 2023-11-28 PROCEDURE — G8420 CALC BMI NORM PARAMETERS: HCPCS | Performed by: PSYCHIATRY & NEUROLOGY

## 2023-11-28 PROCEDURE — 1036F TOBACCO NON-USER: CPT | Performed by: PSYCHIATRY & NEUROLOGY

## 2023-11-28 PROCEDURE — 3017F COLORECTAL CA SCREEN DOC REV: CPT | Performed by: PSYCHIATRY & NEUROLOGY

## 2023-11-28 PROCEDURE — 1123F ACP DISCUSS/DSCN MKR DOCD: CPT | Performed by: PSYCHIATRY & NEUROLOGY

## 2023-11-28 PROCEDURE — G8484 FLU IMMUNIZE NO ADMIN: HCPCS | Performed by: PSYCHIATRY & NEUROLOGY

## 2023-11-28 PROCEDURE — 99215 OFFICE O/P EST HI 40 MIN: CPT | Performed by: PSYCHIATRY & NEUROLOGY

## 2023-11-28 PROCEDURE — 3074F SYST BP LT 130 MM HG: CPT | Performed by: PSYCHIATRY & NEUROLOGY

## 2023-11-28 PROCEDURE — G8399 PT W/DXA RESULTS DOCUMENT: HCPCS | Performed by: PSYCHIATRY & NEUROLOGY

## 2023-11-28 PROCEDURE — 1090F PRES/ABSN URINE INCON ASSESS: CPT | Performed by: PSYCHIATRY & NEUROLOGY

## 2023-11-28 ASSESSMENT — PATIENT HEALTH QUESTIONNAIRE - PHQ9
2. FEELING DOWN, DEPRESSED OR HOPELESS: 0
SUM OF ALL RESPONSES TO PHQ QUESTIONS 1-9: 0
SUM OF ALL RESPONSES TO PHQ9 QUESTIONS 1 & 2: 0
SUM OF ALL RESPONSES TO PHQ QUESTIONS 1-9: 0
1. LITTLE INTEREST OR PLEASURE IN DOING THINGS: 0

## 2023-11-28 NOTE — ASSESSMENT & PLAN NOTE
Patient is to remain on donepezil.   She does have a diagnosis of mild cognitive impairment with memory loss which puts her at greater risk for development of AD and I encouraged the patient to get a repeat testing with Dr. Kaushik Portillo completed

## 2023-11-28 NOTE — PATIENT INSTRUCTIONS
As per discussion    Please clarify when you go home with you are on the 5 mg of the donepezil the other name for that is Aricept or you are actually taking the 10 mg  I want you on the 10 mg dose that is my preference.   That is the best clinical dose to be yet to help maintain cognition  It does not necessarily make things get better but it helps to keep it from getting worse    If you are on the 5 mg start taking  2 tablets until gone and let me know so I can send in the 10 mg dosing if you are already on the 10 mg continue to take 1 tablet/day    Other than that continue to stay active and eat healthy to help promote brain health    We do need to get the second evaluation with Dr. Valencia Ask to determine how things are going related to your memory

## 2023-11-28 NOTE — ASSESSMENT & PLAN NOTE
Condition remains stable. She remains on Aricept. When she was last here I switched her to 10 mg but her medication list is 5 mg she does not know which milligram she is taking I have asked her and her sister to go home and clarify the dosing. Once we have that clarified we can get the medication list adjusted correctly. Patient is to continue to stay active exercise and eat well to promote brain health  I agree with her son living with her. And have recommended that her son the second set of eyes on her medication administration    She is due to have a repeat neuropsych testing to evaluate for progression.   I have encouraged her to keep that appointment    At this time I do think she is safe to drive she seems to know her limitations

## 2023-11-30 ENCOUNTER — TELEPHONE (OUTPATIENT)
Age: 73
End: 2023-11-30

## 2023-11-30 NOTE — TELEPHONE ENCOUNTER
Called patient at the request of Betzy's nurse to r/s her annual follow up with Dr Flores.  Patient stated she is not interested in scheduling anything at this time.

## 2025-02-25 ENCOUNTER — TELEPHONE (OUTPATIENT)
Age: 75
End: 2025-02-25

## 2025-02-25 NOTE — TELEPHONE ENCOUNTER
Patient's sister, Rosa, called stating that she would need a letter stating that patient was diagnosed with dementia at this office. Rosa stated that Pt's daughter, Diya (on phi), would either  this letter or have us fax it to her if we can do this. Please advise.    Diya's ph #:  [phone]    *Last PHI form we have is from 2022 and only lists patient's daughter, Diya*

## 2025-02-25 NOTE — TELEPHONE ENCOUNTER
Patient sisterRosa is asking if we can just mail it instead. Please mail to: Gale Chang (patient daughter) [address], [place], [place], [address]. Thank you.

## 2025-02-27 NOTE — TELEPHONE ENCOUNTER
Called Rosa and confirmed that they need the letter for paperwork to get a durable power of  and for finances    Called patient's daughter to confirm if it was okay to be speaking with Rosa. She gives verbal permission.     She mentions that her mother's behavior has been seriously worsening.   The patient has been closing out bank accounts  Making u-turns in places getting stuck in mud  Living with drug addict and giving away $100's daily  She got lost on foot and could not find her vehicle for 8 hours.   Unsure if she is taking medication.     Confirmed with daughter that patient has appt for 4/22/25. Advised that I think the patient really needs to be seen and to try and keep this appointment. She states she could do an earlier appointment if we had one. Due to serious concerns for patient added to Dr. Carter schedule for 3/13/25.     Confirmed with patient's daughter that the letter could wait to be addressed then.

## 2025-04-22 ENCOUNTER — CLINICAL DOCUMENTATION (OUTPATIENT)
Age: 75
End: 2025-04-22

## 2025-04-22 ENCOUNTER — OFFICE VISIT (OUTPATIENT)
Age: 75
End: 2025-04-22
Payer: MEDICARE

## 2025-04-22 VITALS
RESPIRATION RATE: 19 BRPM | HEIGHT: 61 IN | OXYGEN SATURATION: 99 % | DIASTOLIC BLOOD PRESSURE: 88 MMHG | BODY MASS INDEX: 21.94 KG/M2 | HEART RATE: 58 BPM | WEIGHT: 116.2 LBS | SYSTOLIC BLOOD PRESSURE: 136 MMHG | TEMPERATURE: 98.2 F

## 2025-04-22 DIAGNOSIS — G30.1 DEMENTIA OF THE ALZHEIMER'S TYPE WITH LATE ONSET WITHOUT BEHAVIORAL DISTURBANCE (HCC): ICD-10-CM

## 2025-04-22 DIAGNOSIS — G31.84 MCI (MILD COGNITIVE IMPAIRMENT) WITH MEMORY LOSS: ICD-10-CM

## 2025-04-22 DIAGNOSIS — F02.80 DEMENTIA OF THE ALZHEIMER'S TYPE WITH LATE ONSET WITHOUT BEHAVIORAL DISTURBANCE (HCC): ICD-10-CM

## 2025-04-22 DIAGNOSIS — R41.3 DISTURBANCE OF MEMORY: Primary | ICD-10-CM

## 2025-04-22 DIAGNOSIS — E03.4 HYPOTHYROIDISM DUE TO ACQUIRED ATROPHY OF THYROID: ICD-10-CM

## 2025-04-22 DIAGNOSIS — E53.8 B12 DEFICIENCY: ICD-10-CM

## 2025-04-22 DIAGNOSIS — I67.89 CEREBRAL MICROVASCULAR DISEASE: ICD-10-CM

## 2025-04-22 DIAGNOSIS — I65.23 BILATERAL CAROTID ARTERY STENOSIS: ICD-10-CM

## 2025-04-22 DIAGNOSIS — E55.9 VITAMIN D DEFICIENCY: ICD-10-CM

## 2025-04-22 DIAGNOSIS — R41.3 DISTURBANCE OF MEMORY: ICD-10-CM

## 2025-04-22 PROCEDURE — 1036F TOBACCO NON-USER: CPT | Performed by: PSYCHIATRY & NEUROLOGY

## 2025-04-22 PROCEDURE — G8427 DOCREV CUR MEDS BY ELIG CLIN: HCPCS | Performed by: PSYCHIATRY & NEUROLOGY

## 2025-04-22 PROCEDURE — 1159F MED LIST DOCD IN RCRD: CPT | Performed by: PSYCHIATRY & NEUROLOGY

## 2025-04-22 PROCEDURE — 3075F SYST BP GE 130 - 139MM HG: CPT | Performed by: PSYCHIATRY & NEUROLOGY

## 2025-04-22 PROCEDURE — 1123F ACP DISCUSS/DSCN MKR DOCD: CPT | Performed by: PSYCHIATRY & NEUROLOGY

## 2025-04-22 PROCEDURE — 3079F DIAST BP 80-89 MM HG: CPT | Performed by: PSYCHIATRY & NEUROLOGY

## 2025-04-22 PROCEDURE — G8420 CALC BMI NORM PARAMETERS: HCPCS | Performed by: PSYCHIATRY & NEUROLOGY

## 2025-04-22 PROCEDURE — 1090F PRES/ABSN URINE INCON ASSESS: CPT | Performed by: PSYCHIATRY & NEUROLOGY

## 2025-04-22 PROCEDURE — 3017F COLORECTAL CA SCREEN DOC REV: CPT | Performed by: PSYCHIATRY & NEUROLOGY

## 2025-04-22 PROCEDURE — 1160F RVW MEDS BY RX/DR IN RCRD: CPT | Performed by: PSYCHIATRY & NEUROLOGY

## 2025-04-22 PROCEDURE — G8399 PT W/DXA RESULTS DOCUMENT: HCPCS | Performed by: PSYCHIATRY & NEUROLOGY

## 2025-04-22 PROCEDURE — 1126F AMNT PAIN NOTED NONE PRSNT: CPT | Performed by: PSYCHIATRY & NEUROLOGY

## 2025-04-22 PROCEDURE — 99215 OFFICE O/P EST HI 40 MIN: CPT | Performed by: PSYCHIATRY & NEUROLOGY

## 2025-04-22 RX ORDER — CHLORAL HYDRATE 500 MG
CAPSULE ORAL
COMMUNITY

## 2025-04-22 RX ORDER — DONEPEZIL HYDROCHLORIDE 10 MG/1
10 TABLET, FILM COATED ORAL
Qty: 30 TABLET | Refills: 11 | Status: SHIPPED | OUTPATIENT
Start: 2025-04-22

## 2025-04-22 RX ORDER — MULTIVITAMIN WITH IRON
1 TABLET ORAL DAILY
COMMUNITY

## 2025-04-22 RX ORDER — ACETAMINOPHEN 160 MG
2000 TABLET,DISINTEGRATING ORAL DAILY
COMMUNITY

## 2025-04-22 ASSESSMENT — PATIENT HEALTH QUESTIONNAIRE - PHQ9
SUM OF ALL RESPONSES TO PHQ QUESTIONS 1-9: 0
1. LITTLE INTEREST OR PLEASURE IN DOING THINGS: NOT AT ALL
2. FEELING DOWN, DEPRESSED OR HOPELESS: NOT AT ALL
SUM OF ALL RESPONSES TO PHQ QUESTIONS 1-9: 0

## 2025-04-22 NOTE — PROGRESS NOTES
Consult  REFERRED BY:  Shirley Gagnon MD    CHIEF COMPLAINT: Patient seen on urgent work in basis as a new patient to me for evaluation of new problem of progressive memory loss over the last year.      Subjective:     Joanie Roca is a 74 y.o. right-handed  female seen at the request of Dr. Gagnon for evaluation of new problem of Patient seen on urgent work in basis as a new patient to me for evaluation of new problem of progressive memory loss over the last year.  Patient's daughter states that she thinks the memory has gotten a little bit worse.  Patient was previously seen back in 2022 for evaluation of memory loss 3 years ago, and was diagnosed with mild cognitive impairment after she had an MRI scan of the brain that was relatively normal in 2022, and was placed on Aricept 5 mg a day, and was seen last in 2023 in November and was supposed to get another neuropsych eval but the patient canceled it, because the nurse practitioner  thought she was getting worse.  She has not been seen in nearly a year and a half but claims that she is taking her medication of donepezil 5 mg every day still.  She does stay physically active and exercises regularly, and eats a good diet like a Mediterranean diet, and we did encourage her to stay mentally active in addition.  Patient has had no side effect on the medication so far.  We recommended to her and her family that were with her today, that she stayed mentally and physically active, eat a Mediterranean type diet and take her multivitamin and vitamin D 3 every day, at a dose of 2000 units.  We will repeat her MRI of the brain since it has been almost 3 years since the last and get carotid Doppler studies to rule out any vascular cause of her dementia, and check her thyroid B12 and vitamin D levels because I do not see any on the chart in the family does not think she has had those checks, to rule out any other cause of memory loss.  She has a son living

## 2025-05-05 ENCOUNTER — CLINICAL DOCUMENTATION (OUTPATIENT)
Age: 75
End: 2025-05-05

## 2025-05-06 NOTE — PROGRESS NOTES
Patient does have elevated phosphorylated tau levels, we are awaiting the MRI scan, and she will probably need theAPOE4 test also.

## 2025-05-10 ENCOUNTER — HOSPITAL ENCOUNTER (OUTPATIENT)
Facility: HOSPITAL | Age: 75
Discharge: HOME | End: 2025-05-13
Attending: PSYCHIATRY & NEUROLOGY
Payer: MEDICARE

## 2025-05-10 DIAGNOSIS — G31.84 MCI (MILD COGNITIVE IMPAIRMENT) WITH MEMORY LOSS: ICD-10-CM

## 2025-05-10 DIAGNOSIS — G30.1 DEMENTIA OF THE ALZHEIMER'S TYPE WITH LATE ONSET WITHOUT BEHAVIORAL DISTURBANCE (HCC): ICD-10-CM

## 2025-05-10 DIAGNOSIS — F02.80 DEMENTIA OF THE ALZHEIMER'S TYPE WITH LATE ONSET WITHOUT BEHAVIORAL DISTURBANCE (HCC): ICD-10-CM

## 2025-05-10 DIAGNOSIS — R41.3 DISTURBANCE OF MEMORY: ICD-10-CM

## 2025-05-10 PROCEDURE — A9579 GAD-BASE MR CONTRAST NOS,1ML: HCPCS | Performed by: PSYCHIATRY & NEUROLOGY

## 2025-05-10 PROCEDURE — 6360000004 HC RX CONTRAST MEDICATION: Performed by: PSYCHIATRY & NEUROLOGY

## 2025-05-10 PROCEDURE — 70553 MRI BRAIN STEM W/O & W/DYE: CPT

## 2025-05-10 RX ADMIN — GADOTERIDOL 10 ML: 279.3 INJECTION, SOLUTION INTRAVENOUS at 09:08

## 2025-05-14 ENCOUNTER — CLINICAL DOCUMENTATION (OUTPATIENT)
Age: 75
End: 2025-05-14

## 2025-05-14 NOTE — PROGRESS NOTES
I called the patient, no answer, so I left message on her machine and let her know the MRI scan of the brain was okay, she can check the results on [employer]